# Patient Record
Sex: MALE | Race: WHITE | URBAN - METROPOLITAN AREA
[De-identification: names, ages, dates, MRNs, and addresses within clinical notes are randomized per-mention and may not be internally consistent; named-entity substitution may affect disease eponyms.]

---

## 2017-09-22 ENCOUNTER — RADIANT APPOINTMENT (OUTPATIENT)
Dept: GENERAL RADIOLOGY | Facility: CLINIC | Age: 32
End: 2017-09-22
Attending: PHYSICIAN ASSISTANT
Payer: COMMERCIAL

## 2017-09-22 ENCOUNTER — OFFICE VISIT (OUTPATIENT)
Dept: FAMILY MEDICINE | Facility: CLINIC | Age: 32
End: 2017-09-22
Payer: COMMERCIAL

## 2017-09-22 VITALS
WEIGHT: 218.6 LBS | SYSTOLIC BLOOD PRESSURE: 135 MMHG | OXYGEN SATURATION: 98 % | DIASTOLIC BLOOD PRESSURE: 80 MMHG | TEMPERATURE: 98.5 F | BODY MASS INDEX: 31.37 KG/M2 | HEART RATE: 71 BPM

## 2017-09-22 DIAGNOSIS — S49.91XA RIGHT SHOULDER INJURY, INITIAL ENCOUNTER: Primary | ICD-10-CM

## 2017-09-22 PROCEDURE — 99213 OFFICE O/P EST LOW 20 MIN: CPT | Performed by: PHYSICIAN ASSISTANT

## 2017-09-22 PROCEDURE — 73030 X-RAY EXAM OF SHOULDER: CPT | Mod: RT

## 2017-09-22 NOTE — MR AVS SNAPSHOT
After Visit Summary   9/22/2017    Getachew Pizano    MRN: [id number]           Patient Information     Date Of Birth          1985        Visit Information        Provider Department      9/22/2017 1:40 PM Diana Riuz PA-C [facility]        Today's Diagnoses     Right shoulder injury, initial encounter    -  1      Care Instructions    Continue to rest and ice the shoulder often.    Alternate ibuprofen and tylenol as needed for discomfort.     Follow up for your MRI as scheduled.    Follow up with orthopedics as scheduled next week.          Follow-ups after your visit        Additional Services     ORTHOPEDICS ADULT REFERRAL       Your provider has referred you to: [facility]: [facility] [phone]   [email or web address]    Please be aware that coverage of these services is subject to the terms and limitations of your health insurance plan.  Call member services at your health plan with any benefit or coverage questions.      Please bring the following to your appointment:    >>   Any x-rays, CTs or MRIs which have been performed.  Contact the facility where they were done to arrange for  prior to your scheduled appointment.    >>   List of current medications   >>   This referral request   >>   Any documents/labs given to you for this referral                  Your next 10 appointments already scheduled     Sep 25, 2017  3:30 PM CDT   MR SHOULDER RIGHT W/O CONTRAST with [id number]   [facility] ([facility]3] [place] ([facility])    [address] [address] [address] [phone]           Take your medicines as usual, unless your doctor tells you not to. Bring a list of your current medicines to your exam (including vitamins, minerals and over-the-counter drugs). Also bring the results of similar scans you may have had.  Please remove any body piercings and hair extensions before you arrive.  Follow your  doctor s orders. If you do not, we may have to postpone your exam.  You will not have contrast for this exam. You do not need to do anything special to prepare.  The MRI machine uses a strong magnet. Please wear clothes without metal (snaps, zippers). A sweatsuit works well, or we may give you a hospital gown.   **IMPORTANT** THE INSTRUCTIONS BELOW ARE ONLY FOR THOSE PATIENTS WHO HAVE BEEN TOLD THEY WILL RECEIVE SEDATION OR GENERAL ANESTHESIA DURING THEIR MRI PROCEDURE:  IF YOU WILL RECEIVE SEDATION (take medicine to help you relax during your exam):   You must get the medicine from your doctor before you arrive. Bring the medicine to the exam. Do not take it at home.   Arrive one hour early. Bring someone who can take you home after the test. Your medicine will make you sleepy. After the exam, you may not drive, take a bus or take a taxi by yourself.   No eating 8 hours before your exam. You may have clear liquids up until 4 hours before your exam. (Clear liquids include water, clear tea, black coffee and fruit juice without pulp.)  IF YOU WILL RECEIVE ANESTHESIA (be asleep for your exam):   Arrive 1 1/2 hours early. Bring someone who can take you home after the test. You may not drive, take a bus or take a taxi by yourself.   No eating 8 hours before your exam. You may have clear liquids up until 4 hours before your exam. (Clear liquids include water, clear tea, black coffee and fruit juice without pulp.)   You will spend four to five hours in the recovery room.  Please call the Imaging Department at your exam site with any questions.            Sep 28, 2017  9:00 AM CDT   [facility] Visit [facility]dez [facility]   [facility]    [address] [facility] [address] [phone]              Future tests that were ordered for you today     Open Future Orders        Priority Expected Expires Ordered    MR Shoulder Right w/o Contrast Routine  9/22/2018 9/22/2017             Who to contact     If you have questions or need follow up information about today's clinic visit or your schedule please contact [facility] directly at [phone].  Normal or non-critical lab and imaging results will be communicated to you by MyChart, letter or phone within 4 business days after the clinic has received the results. If you do not hear from us within 7 days, please contact the clinic through [employer] or phone. If you have a critical or abnormal lab result, we will notify you by phone as soon as possible.  Submit refill requests through [employer] or call your pharmacy and they will forward the refill request to us. Please allow 3 business days for your refill to be completed.          Additional Information About Your Visit        [employer] Information     [employer] gives you secure access to your electronic health record. If you see a primary care provider, you can also send messages to your care team and make appointments. If you have questions, please call your primary care clinic.  If you do not have a primary care provider, please call [phone] and they will assist you.        Care EveryWhere ID     This is your Care EveryWhere ID. This could be used by other organizations to access your [place] medical records  [id number]        Your Vitals Were     Pulse Temperature Pulse Oximetry BMI (Body Mass Index)          71 98.5  F (36.9  C) (Oral) 98% 31.37 kg/m2         Blood Pressure from Last 3 Encounters:   09/22/17 135/80   11/03/16 (!) 157/97   11/01/16 (!) 150/98    Weight from Last 3 Encounters:   09/22/17 218 lb 9.6 oz (99.2 kg)   11/03/16 257 lb (116.6 kg)   11/01/16 258 lb (117 kg)              We Performed the Following     ORTHOPEDICS ADULT REFERRAL     XR Shoulder Right 2 Views        Primary Care Provider    Physician No Ref-Primary       No address on file        Equal Access to Services     ACOSTA JOHNSON : saeid Smith qaybta  pricilla cope ibeth licea. So [facility] [phone].    ATENCIÓN: Si habla zoraida, tiene a fountain disposición servicios gratuitos de asistencia lingüística. Llame al [phone].    We comply with applicable federal civil rights laws and [place] laws. We do not discriminate on the basis of race, color, national origin, age, disability sex, sexual orientation or gender identity.            Thank you!     Thank you for choosing [facility]  for your care. Our goal is always to provide you with excellent care. Hearing back from our patients is one way we can continue to improve our services. Please take a few minutes to complete the written survey that you may receive in the mail after your visit with us. Thank you!             Your Updated Medication List - Protect others around you: Learn how to safely use, store and throw away your medicines at www.disposemymeds.org.      Notice  As of 9/22/2017  2:12 PM    You have not been prescribed any medications.

## 2017-09-22 NOTE — LETTER
[facility]  [address] [facility] [address]  Phone: [phone]    September 22, 2017        Getachew Pizano  [address] [address]          To whom it may concern:    RE: Getachew Pizano    Patient was seen and treated today at our clinic and missed work. He is missing work due to an injury he did not sustain from work. He is excused from work until re-evaluated by Orthopedics on 9/28/17.    Please contact me for questions or concerns.      Sincerely,        Diana Ruiz PA-C

## 2017-09-22 NOTE — PROGRESS NOTES
SUBJECTIVE:   Getachew Pizano is a 32 year old male who presents to clinic today for the following health issues:        Musculoskeletal problem/pain      Duration: 3 weeks    Description  Location: right shoulder pain     Intensity:  8/10 when lifting things    Accompanying signs and symptoms: none    History  Previous similar problem: Not on right shoulder- on left shoulder  Previous evaluation:  none    Precipitating or alleviating factors:  Trauma or overuse: YES- fell off scaffolding  Aggravating factors include: lifting and using it in general    Therapies tried and outcome: rest/inactivity and stretching    Fell 6-8 feet off of scaffolding. Landed on his feet and hands.   Locates pain to the anterior right shoulder.  Decreased range of motion with abduction - unable to go past 90 degrees.  Denies numbness and tingling.   Denies any ecchymosis and swelling.  He is right hand dominant.     He works on the [employer]. Requesting a letter for work.       Problem list and histories reviewed & adjusted, as indicated.  Additional history: as documented    Patient Active Problem List   Diagnosis     CARDIOVASCULAR SCREENING; LDL GOAL LESS THAN 160     Glenoid labrum tear     Tobacco abuse     Scapholunate ligament injury with no instability     Left wrist pain     Past Surgical History:   Procedure Laterality Date     C SHOULDER SURG PROC UNLISTED         Social History   Substance Use Topics     Smoking status: Current Every Day Smoker     Packs/day: 1.00     Years: 8.00     Types: Cigarettes     Smokeless tobacco: Never Used     Alcohol use Yes      Comment: Social     Family History   Problem Relation Age of Onset     Neurologic Disorder Maternal Grandfather      Dementia     DIABETES No family hx of          No current outpatient prescriptions on file.     No Known Allergies  BP Readings from Last 3 Encounters:   09/22/17 135/80   11/03/16 (!) 157/97   11/01/16 (!) 150/98    Wt Readings from Last 3 Encounters:    09/22/17 218 lb 9.6 oz (99.2 kg)   11/03/16 257 lb (116.6 kg)   11/01/16 258 lb (117 kg)                        Reviewed and updated as needed this visit by clinical staffTobacco  Allergies  Meds  Med Hx  Surg Hx  Fam Hx  Soc Hx      Reviewed and updated as needed this visit by Provider         ROS:  Constitutional, musculoskeletal and integumentary systems are negative, except as otherwise noted.      OBJECTIVE:   /80  Pulse 71  Temp 98.5  F (36.9  C) (Oral)  Wt 218 lb 9.6 oz (99.2 kg)  SpO2 98%  BMI 31.37 kg/m2  Body mass index is 31.37 kg/(m^2).  GENERAL: healthy, alert and no distress  MS: right shoulder: no significant tenderness to palpation, decreased range of motion - unable to abduct arm past 90 degrees due to pain, good flexion and extension, normal sensation and capillary refill, no obvious deformity observed or palpated  SKIN: no suspicious lesions or rashes    Diagnostic Test Results:  Xray - unremarkable. Radiology read is pending    ASSESSMENT/PLAN:       ICD-10-CM    1. Right shoulder injury, initial encounter S49.91XA MR Shoulder Right w/o Contrast     ORTHOPEDICS ADULT REFERRAL     XR Shoulder Right G/E 3 Views     CANCELED: XR Shoulder Right 2 Views     Concerns for rotator cuff tear due to decreased range of motion. Will refer for MRI and orthopedic follow up.     Patient Instructions   Continue to rest and ice the shoulder often.    Alternate ibuprofen and tylenol as needed for discomfort.     Follow up for your MRI as scheduled.    Follow up with orthopedics as scheduled next week.      Diana Ruiz PA-C  [facility]

## 2017-09-22 NOTE — LETTER
[facility]  [address] [facility] [address]  Phone: [phone]    September 22, 2017        Getachew Pizano  [address] [address] [address]          To whom it may concern:    RE: Getachew Pizano    Patient was seen and treated today at our clinic and missed work. Patient missed work due to an injury, which he did not receive from work.     Please contact me for questions or concerns.      Sincerely,        Diana Ruiz PA-C

## 2017-09-22 NOTE — PATIENT INSTRUCTIONS
Continue to rest and ice the shoulder often.    Alternate ibuprofen and tylenol as needed for discomfort.     Follow up for your MRI as scheduled.    Follow up with orthopedics as scheduled next week.

## 2017-09-22 NOTE — NURSING NOTE
"Chief Complaint   Patient presents with     Shoulder Pain       Initial /80  Pulse 71  Temp 98.5  F (36.9  C) (Oral)  Wt 218 lb 9.6 oz (99.2 kg)  SpO2 98%  BMI 31.37 kg/m2 Estimated body mass index is 31.37 kg/(m^2) as calculated from the following:    Height as of 11/1/16: 5' 10\" (1.778 m).    Weight as of this encounter: 218 lb 9.6 oz (99.2 kg).  Medication Reconciliation: complete    Monie Brooks CMA  "

## 2017-09-25 ENCOUNTER — RADIANT APPOINTMENT (OUTPATIENT)
Dept: MRI IMAGING | Facility: CLINIC | Age: 32
End: 2017-09-25
Attending: PHYSICIAN ASSISTANT
Payer: COMMERCIAL

## 2017-09-25 DIAGNOSIS — S49.91XA RIGHT SHOULDER INJURY, INITIAL ENCOUNTER: ICD-10-CM

## 2017-09-25 PROCEDURE — 73221 MRI JOINT UPR EXTREM W/O DYE: CPT | Mod: TC

## 2017-09-28 ENCOUNTER — OFFICE VISIT (OUTPATIENT)
Dept: ORTHOPEDICS | Facility: CLINIC | Age: 32
End: 2017-09-28
Payer: COMMERCIAL

## 2017-09-28 VITALS — HEIGHT: 70 IN | WEIGHT: 218 LBS | RESPIRATION RATE: 16 BRPM | BODY MASS INDEX: 31.21 KG/M2

## 2017-09-28 DIAGNOSIS — S46.011A STRAIN OF MUSCLE(S) AND TENDON(S) OF THE ROTATOR CUFF OF RIGHT SHOULDER, INITIAL ENCOUNTER: ICD-10-CM

## 2017-09-28 DIAGNOSIS — S46.111A STRAIN OF MUSCLE, FASCIA AND TENDON OF LONG HEAD OF BICEPS, RIGHT ARM, INITIAL ENCOUNTER: Primary | ICD-10-CM

## 2017-09-28 PROCEDURE — 99243 OFF/OP CNSLTJ NEW/EST LOW 30: CPT | Performed by: ORTHOPAEDIC SURGERY

## 2017-09-28 NOTE — PROGRESS NOTES
"SUBJECTIVE:  Getachew Pizano is a 32 year old male who is seen in consultation at the request of Diana Ruiz (KEREN) for right shoulder injury that occurred on 9/1/17. Patient fell off a 6-8 foot scaffold, landing on his feet and then rolled forward onto outstretched hands. The patient felt shoulder pain immediately following the injury. He returned to work 3 weeks after the injury and has had trouble using manual hand brake at work.    Present symptoms: limited ROM, deep pain, pain lifting things, reaching across body, pain reaching behind the back, +popping and cracking in shoulder. Positional night pain.  Pain severity: 8/10  Pain location: anterior shoulder    No associated neck pain.    Treatment up to this point: stretching  Occupation:      Past Medical History:   Diagnosis Date     NO ACTIVE PROBLEMS        Past Surgical History:   Procedure Laterality Date     C SHOULDER SURG PROC UNLISTED         REVIEW OF SYSTEMS:  CONSTITUTIONAL:  NEGATIVE for fever, chills, change in weight  INTEGUMENTARY/SKIN:  NEGATIVE for worrisome rashes, moles or lesions  EYES:  NEGATIVE for vision changes or irritation  ENT/MOUTH:  NEGATIVE for ear, mouth and throat problems  RESP:  NEGATIVE for significant cough or SOB  BREAST:  NEGATIVE for masses, tenderness or discharge  CV:  NEGATIVE for chest pain, palpitations or peripheral edema  GI:  NEGATIVE for nausea, abdominal pain, heartburn, or change in bowel habits  :  Negative   MUSCULOSKELETAL:  See HPI above  NEURO:  NEGATIVE for weakness, dizziness or paresthesias  ENDOCRINE:  NEGATIVE for temperature intolerance, skin/hair changes  HEME/ALLERGY/IMMUNE:  NEGATIVE for bleeding problems  PSYCHIATRIC:  NEGATIVE for changes in mood or affect    OBJECTIVE:  Resp 16  Ht 1.778 m (5' 10\")  Wt 98.9 kg (218 lb)  BMI 31.28 kg/m2     GENERAL: healthy, alert and no distress  GAIT: normal   SKIN: no suspicious lesions or rashes  NEURO: Normal strength and tone, " mentation intact and speech normal  VASCULAR: normal pulses and cappillary refill   PSYCH:  mentation appears normal and affect normal/bright  RESP: No increased work of breathing  LYMPH:  No lymphedema    MUSCULOSKELETAL:    NECK:  Cervical range of motion: full,  painfree, and does not cause shoulder pain or reproduce shoulder pain.    SHOULDER:  Shoulder Inspection: no rotator cuff atrophy  Tender: proximal bicep tendon  Non-tender: AC joint and greater tuberosity  Range of Motion:   Active: Forward flexion full, Internal rotation: to back pocket   Passive: External rotation: 70, no crepitations  Strength: forward flexion 5-/5, External rotation 5/5   Liftoff: can't perform due to mechanical ROM, Bear Hug: Able    Special tests: Sulcus: 0  Apprehension: painful  Speed: Positive  Anterior Drawer: 0  Posterior Drawer: 0  O'Araceli (SLAP/biceps):  Positive    X-RAY   Obtained 09/22/17 of the RIGHT SHOULDER: 2-views, reviewed in the office with the patient by myself today and show a type III acromian, normal glenohumeral and AC joints, and no fractures.     MRI: From 09/25/17 of RIGHT SHOULDER was reviewed personally by myself:   1. Nonspecific red marrow pattern, as above.  2. The rotator cuff appears unremarkable.  3. Fluid signal within the biceps tendon noted.  Biceps in the groove.  4. Type 3 acromion  5.  SLAP area looks normal.    ASSESSMENT  1. Mild rotator cuff strain  2. Biceps strain    PLAN:   I discussed the findings and diagnosis with the patient. We talked about the treatment options: corticosteroid injections and PT. I suggested he ice and take antiinflammatories as needed. I also recommended he start physical therapy. If in three weeks symptoms do not improve, we would discuss a steroid injection. All questions were answered.  PT ordered.   Work note written: off work for 1 week    Return to clinic: 3 weeks or PRN    .EDWIN Hdez MD  Dept. Orthopedic Surgery  Health system    This  document serves as a record of the services and decisions personally performed and made by Dr. EDWIN Hdez MD. It was created on his behalf by Malick Cancino, a trained medical scribe. The creation of this record is based on the provider's personal observations and the statements of the patient. This document has been checked and approved by the attending provider.   Malick Cancino September 28, 2017 9:39 AM

## 2017-09-28 NOTE — LETTER
[facility]  [address] [place]. [address]  [phone]    Getachew J Harinder    : [birth year]    Date: 17      DIAGNOSIS: Right shoulder contusion.    Work related injury/illness? no  Maximum medical improvement? no  Permanent Partial Disability? unknown  Anticipate permanent restrictions: No    To Whom it May Concern:    This patient was in the  Orthopedic clinic for a medically necessary appointment.      RESTRICTIONS:   Unable to safely work with R shoulder pain being limiting factor.    Next visit : 1 week for exam. Future restrictions to be based on recovery, function.      Please contact me for questions or concerns.    Sincerely,    Provider Electronic Signature (as below)        MIKALA Hdez MD

## 2017-09-28 NOTE — LETTER
9/28/2017         RE: Getachew Pizano  [address] [address]        Dear Colleague,    Thank you for referring your patient, Getachew Pizano, to the [facility]. Please see a copy of my visit note below.    SUBJECTIVE:  Getachew Pizano is a 32 year old male who is seen in consultation at the request of Diana Ruiz (KEREN) for right shoulder injury that occurred on 9/1/17. Patient fell off a 6-8 foot scaffold, landing on his feet and then rolled forward onto outstretched hands. The patient felt shoulder pain immediately following the injury. He returned to work 3 weeks after the injury and has had trouble using manual hand brake at work.    Present symptoms: limited ROM, deep pain, pain lifting things, reaching across body, pain reaching behind the back, +popping and cracking in shoulder. Positional night pain.  Pain severity: 8/10  Pain location: anterior shoulder    No associated neck pain.    Treatment up to this point: stretching  Occupation:      Past Medical History:   Diagnosis Date     NO ACTIVE PROBLEMS        Past Surgical History:   Procedure Laterality Date     C SHOULDER SURG PROC UNLISTED         REVIEW OF SYSTEMS:  CONSTITUTIONAL:  NEGATIVE for fever, chills, change in weight  INTEGUMENTARY/SKIN:  NEGATIVE for worrisome rashes, moles or lesions  EYES:  NEGATIVE for vision changes or irritation  ENT/MOUTH:  NEGATIVE for ear, mouth and throat problems  RESP:  NEGATIVE for significant cough or SOB  BREAST:  NEGATIVE for masses, tenderness or discharge  CV:  NEGATIVE for chest pain, palpitations or peripheral edema  GI:  NEGATIVE for nausea, abdominal pain, heartburn, or change in bowel habits  :  Negative   MUSCULOSKELETAL:  See HPI above  NEURO:  NEGATIVE for weakness, dizziness or paresthesias  ENDOCRINE:  NEGATIVE for temperature intolerance, skin/hair changes  HEME/ALLERGY/IMMUNE:  NEGATIVE for bleeding problems  PSYCHIATRIC:  NEGATIVE for changes  "in mood or affect    OBJECTIVE:  Resp 16  Ht 1.778 m (5' 10\")  Wt 98.9 kg (218 lb)  BMI 31.28 kg/m2     GENERAL: healthy, alert and no distress  GAIT: normal   SKIN: no suspicious lesions or rashes  NEURO: Normal strength and tone, mentation intact and speech normal  VASCULAR: normal pulses and cappillary refill   PSYCH:  mentation appears normal and affect normal/bright  RESP: No increased work of breathing  LYMPH:  No lymphedema    MUSCULOSKELETAL:    NECK:  Cervical range of motion: full,  painfree, and does not cause shoulder pain or reproduce shoulder pain.    SHOULDER:  Shoulder Inspection: no rotator cuff atrophy  Tender: proximal bicep tendon  Non-tender: AC joint and greater tuberosity  Range of Motion:   Active: Forward flexion full, Internal rotation: to back pocket   Passive: External rotation: 70, no crepitations  Strength: forward flexion 5-/5, External rotation 5/5   Liftoff: can't perform due to mechanical ROM, Bear Hug: Able    Special tests: Sulcus: 0  Apprehension: painful  Speed: Positive  Anterior Drawer: 0  Posterior Drawer: 0  O'Araceli (SLAP/biceps):  Positive    X-RAY   Obtained 09/22/17 of the RIGHT SHOULDER: 2-views, reviewed in the office with the patient by myself today and show a type III acromian, normal glenohumeral and AC joints, and no fractures.     MRI: From 09/25/17 of RIGHT SHOULDER was reviewed personally by myself:   1. Nonspecific red marrow pattern, as above.  2. The rotator cuff appears unremarkable.  3. Fluid signal within the biceps tendon noted.  Biceps in the groove.  4. Type 3 acromion  5.  SLAP area looks normal.    ASSESSMENT  1. Mild rotator cuff strain  2. Biceps strain    PLAN:   I discussed the findings and diagnosis with the patient. We talked about the treatment options: corticosteroid injections and PT. I suggested he ice and take antiinflammatories as needed. I also recommended he start physical therapy. If in three weeks symptoms do not improve, we would " discuss a steroid injection. All questions were answered.  PT ordered.   Work note written: off work for 1 week    Return to clinic: 3 weeks or PRN    .EDWIN Hdez MD  Dept. Orthopedic Surgery  [facility]    This document serves as a record of the services and decisions personally performed and made by Dr. EDWIN Hdez MD. It was created on his behalf by Malick Cancino, a trained medical scribe. The creation of this record is based on the provider's personal observations and the statements of the patient. This document has been checked and approved by the attending provider.   Malick Cancino September 28, 2017 9:39 AM        Again, thank you for allowing me to participate in the care of your patient.        Sincerely,        Chet Hdez MD

## 2017-09-28 NOTE — NURSING NOTE
"Chief Complaint   Patient presents with     Consult     Right shoulder fell off scaffold at home 9/1/17 had MRI done 9/25/17 review results       Initial Resp 16  Ht 1.778 m (5' 10\")  Wt 98.9 kg (218 lb)  BMI 31.28 kg/m2 Estimated body mass index is 31.28 kg/(m^2) as calculated from the following:    Height as of this encounter: 1.778 m (5' 10\").    Weight as of this encounter: 98.9 kg (218 lb).  Medication Reconciliation: vinod Da Silva CMA 9/28/2017 8:55 AM    "

## 2017-09-28 NOTE — MR AVS SNAPSHOT
After Visit Summary   9/28/2017    Getachew Pizano    MRN: [id number]           Patient Information     Date Of Birth          1985        Visit Information        Provider Department      9/28/2017 9:00 AM Chet Hdez MD [facility]        Today's Diagnoses     Strain of right rotator cuff capsule    -  1      Care Instructions    The diagnosis is a rotator cuff and biceps strain of the right shoulder. Because there no structural damage. I suggest icing periodically no more than 20 minutes at a time. Taking antiinflammatories as needed. Taking Aleve 2 times a day with food for two weeks and then as needed after that. A work note was written to be off work for 1 additional week. Light duty restrictions after the work. We have also ordered physical therapy.          Follow-ups after your visit        Who to contact     If you have questions or need follow up information about today's clinic visit or your schedule please contact [facility] directly at [phone].  Normal or non-critical lab and imaging results will be communicated to you by [employer], letter or phone within 4 business days after the clinic has received the results. If you do not hear from us within 7 days, please contact the clinic through LEPOWt or phone. If you have a critical or abnormal lab result, we will notify you by phone as soon as possible.  Submit refill requests through [employer] or call your pharmacy and they will forward the refill request to us. Please allow 3 business days for your refill to be completed.          Additional Information About Your Visit        MyChart Information     [employer] gives you secure access to your electronic health record. If you see a primary care provider, you can also send messages to your care team and make appointments. If you have questions, please call your primary care clinic.  If you do not have a primary care provider, please call [phone]  "and they will assist you.        Care EveryWhere ID     This is your Care EveryWhere ID. This could be used by other organizations to access your [place] medical records  [id number]        Your Vitals Were     Respirations Height BMI (Body Mass Index)             16 1.778 m (5' 10\") 31.28 kg/m2          Blood Pressure from Last 3 Encounters:   09/22/17 135/80   11/03/16 (!) 157/97   11/01/16 (!) 150/98    Weight from Last 3 Encounters:   09/28/17 98.9 kg (218 lb)   09/22/17 99.2 kg (218 lb 9.6 oz)   11/03/16 116.6 kg (257 lb)              Today, you had the following     No orders found for display       Primary Care Provider Fax #    Physician No Ref-Primary [phone]       No address on file        Equal Access to Services     ACOSTA JOHNSON : Delroy Bucio, saeid kulkarni, dimitri porras, pricilla lion . So [facility] [phone].    ATENCIÓN: Si habla español, tiene a fountain disposición servicios gratuitos de asistencia lingüística. Tonya al [phone].    We comply with applicable federal civil rights laws and [place] laws. We do not discriminate on the basis of race, color, national origin, age, disability sex, sexual orientation or gender identity.            Thank you!     [facility] you [facility]  [facility]. Our goal is always to provide you with excellent care. Hearing back from our patients is one way we can continue to improve our services. Please take a few minutes to complete the written survey that you may receive in the mail after your visit with us. Thank you!             Your Updated Medication List - Protect others around you: Learn how to safely use, store and throw away your medicines at www.disposemymeds.org.      Notice  As of 9/28/2017  9:28 AM    You have not been prescribed any medications.      "

## 2017-09-28 NOTE — PATIENT INSTRUCTIONS
The diagnosis is a rotator cuff and biceps strain of the right shoulder. Because there no structural damage. I suggest icing periodically no more than 20 minutes at a time. Taking antiinflammatories as needed. Taking Aleve 2 times a day with food for two weeks and then as needed after that. A work note was written to be off work for 1 additional week. Light duty restrictions after that. We have also ordered physical therapy.

## 2017-10-02 ENCOUNTER — THERAPY VISIT (OUTPATIENT)
Dept: PHYSICAL THERAPY | Facility: CLINIC | Age: 32
End: 2017-10-02
Payer: COMMERCIAL

## 2017-10-02 DIAGNOSIS — M25.511 ACUTE PAIN OF RIGHT SHOULDER: Primary | ICD-10-CM

## 2017-10-02 PROCEDURE — 97110 THERAPEUTIC EXERCISES: CPT | Mod: GP | Performed by: PHYSICAL THERAPIST

## 2017-10-02 PROCEDURE — 97161 PT EVAL LOW COMPLEX 20 MIN: CPT | Mod: GP | Performed by: PHYSICAL THERAPIST

## 2017-10-02 NOTE — PROGRESS NOTES
"[facility] Initial Evaluation      Subjective:    Patient is a 32 year old male presenting with rehab right shoulder hpi. The history is provided by the patient. No  was used.   Getachew Pizano is a 32 year old male with a right shoulder condition.  Condition occurred with:  A fall.  Condition occurred: at home.  This is a new condition  Pt states fell from scaffolding about eight feet about four weeks ago and was also doing some lifting at about the same time.  Seemed to land on all fours.  Referred to PT 09/28/2017.    Patient reports pain:  Anterior and in the joint.    Pain is described as aching and is intermittent and reported as 1/10.   Worse during: no particular pattern re: time of day.  Exacerbated by: sleeping (especially staying asleep), putting on a shirt, \"everyday things,\" reaching downward. Relieved by: \"not using it\"  Pain course: pt states may have some increased ROM over the past few days.  Special testing: xray 09/22 and MRI 09/25/2017 in chart.      General health as reported by patient is good.  Pertinent medical history includes:  Overweight and smoking.  Medical allergies: no.  Surgical history: L GH labral debridement 2011 which pt indicates was quite helpful.  Current medications:  Anti-inflammatory.  Current occupation is  / conductor.  Patient is currently not working due to present treatment problem.  Primary job tasks include:  Prolonged standing, lifting, repetitive tasks and operating a machine.    Barriers include:  None as reported by the patient.    Red flags:  None as reported by the patient.    Pt is R dominant.  Pt states his goal is to return to upper body weight lifting program.  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                    Objective:    Standing Alignment:    Cervical/Thoracic:  Normal  Shoulder/UE:  Rounded shoulders                                " "  Cervical/Thoracic Evaluation  Cervical AROM: normal                                  Shoulder Evaluation:  ROM:  AROM:    Flexion:  Left:  155    Right:  156 negative  Extension: Left: 65Right: 43 negative  Abduction:  Left: 152   Right:  55 positive    Internal Rotation:  Left:  T7    Right:  Ischial tuberosity negative  External Rotation:  Left:  62    Right:  15 negative                PROM:      Extension:  Right:  60 \"just a little bit\"  Abduction:  Right:  76 empty end feel    Internal Rotation:  Right:  L5 \"tight\"  External Rotation:  Right:  33 positive                    Strength:    Flexion: Left:5/5    Pain: -    Right: 4+/5      Pain:  +  Extension:  Left: 5-/5     Pain:-    Right: 5-/5     Pain:-  Abduction:  Left: 5/5   Pain:-    Right: 4+/5      Pain:-    Internal Rotation:  Left:5/5      Pain:-    Right: 5-/5      Pain:-  External Rotation:   Left:5/5      Pain:-   Right:5-/5      Pain:-        Elbow Flexion:  Left:5/5      Pain:-    Right:5/5      Pain:-    Stability Testing:      Left shoulder stability negative testing:  Apprehension; Relocation; Load and shift anterior and Load and shift posterior  Right shoulder stability positive testing:Apprehension; Relocation; Load and shift anterior and Load and shift posterior  Special Tests:      Right shoulder positive for the following special tests:Labral  Right shoulder negative for the following special tests:Impingement; Rotator cuff tear and Acrimioclavicular  Palpation:      Right shoulder tenderness present at: Bicipital Groove  Right shoulder tenderness not present at:Supraspinatus; Infraspinatus; Teres Minor or Subscapularis  Mobility Tests:    Glenohumeral anterior left:  Normal  Glenohumeral anterior right:  Normal  Glenohumeral posterior left:  Normal  Glenohumeral posterior right:  Normal  Glenohumeral inferior left:  Normal  Glenohumeral inferior right:  Normal                                             General "     ROS    Assessment/Plan:      Patient is a 32 year old male with right side shoulder complaints.    Patient has the following significant findings with corresponding treatment plan.                Diagnosis 1:  R shoulder pain  Pain -  hot/cold therapy  Decreased ROM/flexibility - manual therapy and therapeutic exercise  Decreased strength - therapeutic exercise and therapeutic activities  Decreased function - therapeutic activities  Impaired posture - neuro re-education    Therapy Evaluation Codes:   1) History comprised of:   Personal factors that impact the plan of care:      Time since onset of symptoms.    Comorbidity factors that impact the plan of care are:      Overweight and Smoking.     Medications impacting care: Anti-inflammatory.  2) Examination of Body Systems comprised of:   Body structures and functions that impact the plan of care:      Cervical spine and Shoulder.   Activity limitations that impact the plan of care are:      Lifting and Sleeping.  3) Clinical presentation characteristics are:   Stable/Uncomplicated.  4) Decision-Making    Moderate complexity using standardized patient assessment instrument and/or measureable assessment of functional outcome.  Cumulative Therapy Evaluation is: Low complexity.    Previous and current functional limitations:  (See Goal Flow Sheet for this information)    Short term and Long term goals: (See Goal Flow Sheet for this information)     Communication ability:  Patient appears to be able to clearly communicate and understand verbal and written communication and follow directions correctly.  Treatment Explanation - The following has been discussed with the patient:   RX ordered/plan of care  Anticipated outcomes  Possible risks and side effects  This patient would benefit from PT intervention to resume normal activities.   Rehab potential is good.    Frequency:  2 X week, once daily  Duration:  for 1 weeks tapering to 1 X a week over 3 weeks  Discharge  Plan:  Achieve all LTG.  Independent in home treatment program.  Reach maximal therapeutic benefit.    Please refer to the daily flowsheet for treatment today, total treatment time and time spent performing 1:1 timed codes.

## 2017-10-02 NOTE — MR AVS SNAPSHOT
After Visit Summary   10/2/2017    Getachew Pizano    MRN: [id number]           Patient Information     Date Of Birth          1985        Visit Information        Provider Department      10/2/2017 10:40 AM Tony Beltran, PT [facility] [place] [facility]        Today's Diagnoses     Acute pain of right shoulder    -  1       Follow-ups after your visit        Your next 10 appointments already scheduled     Oct 06, 2017  2:10 PM CDT   CINTHIA Extremity with Maged Mcadams PTA   [facility] [place] [facility] ([facility] [place])    [address]  [place] [address]   [phone]            Oct 13, 2017  2:10 PM CDT   CINTHIA Extremity with Tony Beltran PT   [facility] [place] [facility] ([facility] [place])    [address]  [place] [address]   [phone]            Oct 20, 2017  1:30 PM CDT   CINTHIA Extremity with Maged Mcadams PTA   [facility] [place] [facility] ([facility] [place])    [address]  [place] [address]   [phone]            Oct 27, 2017  2:10 PM CDT   CINTHIA Extremity with Tony Beltran PT   [facility] [place] [facility] ([facility] [place])    [address]  [place] [address]   [phone]              Who to contact     If you have questions or need follow up information about today's clinic visit or your schedule please contact [facility] directly at [phone].  Normal or non-critical lab and imaging results will be communicated to you by MyChart, letter or phone within 4 business days after the clinic has received the results. If you do not hear from us within 7 days, please contact the clinic through MyChart or phone. If you have a critical or abnormal lab result, we will notify you by phone as soon as possible.  Submit refill requests through [employer] or call your pharmacy and they will  forward the refill request to us. Please allow 3 business days for your refill to be completed.          Additional Information About Your Visit        [employer] Information     [employer] gives you secure access to your electronic health record. If you see a primary care provider, you can also send messages to your care team and make appointments. If you have questions, please call your primary care clinic.  If you do not have a primary care provider, please call [phone] and they will assist you.        Care EveryWhere ID     This is your Care EveryWhere ID. This could be used by other organizations to access your [place] medical records  [id number]         Blood Pressure from Last 3 Encounters:   09/22/17 135/80   11/03/16 (!) 157/97   11/01/16 (!) 150/98    Weight from Last 3 Encounters:   09/28/17 98.9 kg (218 lb)   09/22/17 99.2 kg (218 lb 9.6 oz)   11/03/16 116.6 kg (257 lb)              We Performed the Following     PT Eval, Low Complexity ([id number])     Therapeutic Exercises        Primary Care Provider Fax #    Physician No Ref-Primary [phone]       No address on file        Equal Access to Services     MIKAYLA [facility] : Hadii yrn tamez Soatiya, waaxda lubeccaadaha, qaybta kaalmada chiquita, pricilla lion . So [facility] [phone].    ATENCIÓN: Si habla español, tiene a fountain disposición servicios gratuitos de asistencia lingüística. [facility] [phone].    We comply with applicable federal civil rights laws and [place] laws. We do not discriminate on the basis of race, color, national origin, age, disability, sex, sexual orientation, or gender identity.            Thank you!     Thank you for choosing [facility]  for your care. Our goal is always to provide you with excellent care. Hearing back from our patients is one way we can continue to improve our services. Please take a few minutes to complete the written survey that you may receive in  the mail after your visit with us. Thank you!             Your Updated Medication List - Protect others around you: Learn how to safely use, store and throw away your medicines at www.disposemymeds.org.      Notice  As of 10/2/2017 11:42 AM    You have not been prescribed any medications.

## 2017-10-13 ENCOUNTER — THERAPY VISIT (OUTPATIENT)
Dept: PHYSICAL THERAPY | Facility: CLINIC | Age: 32
End: 2017-10-13
Payer: COMMERCIAL

## 2017-10-13 DIAGNOSIS — M25.511 ACUTE PAIN OF RIGHT SHOULDER: Primary | ICD-10-CM

## 2017-10-13 PROCEDURE — 97110 THERAPEUTIC EXERCISES: CPT | Mod: GP | Performed by: PHYSICAL THERAPIST

## 2017-10-13 PROCEDURE — 97530 THERAPEUTIC ACTIVITIES: CPT | Mod: GP | Performed by: PHYSICAL THERAPIST

## 2017-10-13 NOTE — MR AVS SNAPSHOT
After Visit Summary   10/13/2017    Getachew Pizano    MRN: [id number]           Patient Information     Date Of Birth          1985        Visit Information        Provider Department      10/13/2017 2:10 PM Tony Beltran, PT [facility] [place] [facility]        Today's Diagnoses     Acute pain of right shoulder    -  1       Follow-ups after your visit        Your next 10 appointments already scheduled     Oct 20, 2017  1:30 PM CDT   CINTHIA Extremity with Maged Mcadasm PTA   [facility] [place] [facility] ([facility] [place])    [address]  [place] [address]   [phone]            Oct 27, 2017  2:10 PM CDT   CINTHIA Extremity with Tony Beltran PT   [facility] [place] [facility] ([facility] [place])    [address]  [address]  [place] [address]   [phone]              Who to contact     If you have questions or need follow up information about today's clinic visit or your schedule please contact [facility] directly at [phone].  Normal or non-critical lab and imaging results will be communicated to you by [employer]hart, letter or phone within 4 business days after the clinic has received the results. If you do not hear from us within 7 days, please contact the clinic through [employer] or phone. If you have a critical or abnormal lab result, we will notify you by phone as soon as possible.  Submit refill requests through [employer] or call your pharmacy and they will forward the refill request to us. Please allow 3 business days for your refill to be completed.          Additional Information About Your Visit        MyChart Information     [employer] gives you secure access to your electronic health record. If you see a primary care provider, you can also send messages to your care team and make appointments. If you have questions, please call your primary care clinic.  If you do not have a  primary care provider, please call [phone] and they will assist you.        Care EveryWhere ID     This is your Care EveryWhere ID. This could be used by other organizations to access your [place] medical records  [id number]         Blood Pressure from Last 3 Encounters:   09/22/17 135/80   11/03/16 (!) 157/97   11/01/16 (!) 150/98    Weight from Last 3 Encounters:   09/28/17 98.9 kg (218 lb)   09/22/17 99.2 kg (218 lb 9.6 oz)   11/03/16 116.6 kg (257 lb)              We Performed the Following     Therapeutic Activities     Therapeutic Exercises        Primary Care Provider    Physician No Ref-Primary       NO REF-PRIMARY PHYSICIAN        Equal Access to Services     ACOSTA JOHNSON : Hadii yrn Bucio, walm kulkarni, qaybta kaalmada chiquita, pricilla lion . So [facility] [phone].    ATENCIÓN: Si habla español, tiene a fountain disposición servicios gratuitos de asistencia lingüística. Llame al [phone].    We comply with applicable federal civil rights laws and [place] laws. We do not discriminate on the basis of race, color, national origin, age, disability, sex, sexual orientation, or gender identity.            Thank you!     Thank you for choosing [facility]  for your care. Our goal is always to provide you with excellent care. Hearing back from our patients is one way we can continue to improve our services. Please take a few minutes to complete the written survey that you may receive in the mail after your visit with us. Thank you!             Your Updated Medication List - Protect others around you: Learn how to safely use, store and throw away your medicines at www.disposemymeds.org.      Notice  As of 10/13/2017  3:09 PM    You have not been prescribed any medications.

## 2017-10-13 NOTE — PROGRESS NOTES
Subjective:    HPI                    Objective:    System    Physical Exam    General     ROS    Assessment/Plan:      SUBJECTIVE  Subjective: Pt reports he feels like has had some increased ROM since last appt.  HEP going well and feels is helpful, overall moving the right direction.  Still has episodes where there is pain, seems to happen 3-5 times a day where he gets sharp pain that only lasts a few seconds.   Current Pain level: 1/10   Changes in function:  Yes (See Goal flowsheet attached for changes in current functional level)     Adverse reaction to treatment or activity:  None    OBJECTIVE  Objective: AROM R shoulder flexion 154, abduction 108, ER 63, IR L5 midline.     ASSESSMENT  Getachew continues to require intervention to meet STG and LTG's: PT  Patient is progressing as expected.  Response to therapy has shown an improvement in  ROM   Progress made towards STG/LTG?  Yes (See Goal flowsheet attached for updates on achievement of STG and LTG)    PLAN  Current treatment program is being advanced to more complex exercises.    PTA/ATC plan:  N/A    Please refer to the daily flowsheet for treatment today, total treatment time and time spent performing 1:1 timed codes.

## 2017-10-24 NOTE — PROGRESS NOTES
SUBJECTIVE:  Getachew Pizano is a 32 year old male who is seen in follow-up for his rotator cuff strain and biceps strain of the right shoulder. He reports that his symptoms have improved and he has increased his range of motion. The patient reports that physical therapy has been helpful.     Symptoms have been improving since last visit.    Doing therapy/exercises: (y/n): Yes    GENERAL: healthy, alert and no distress  GAIT: normal   SKIN: no suspicious lesions or rashes  NEURO: Normal strength and tone, mentation intact and speech normal  VASCULAR: normal pulses and cappillary refill   PSYCH:  mentation appears normal and affect normal/bright    SHOULDER:  Strength: forward flexion 5/5  Impingement: Bishop: 1, Neer: 1 and AER: 1  Special tests: Speed: Positive    IMPRESSION:   Doing well status post rotator cuff strain and biceps strain of the right shoulder    PLAN:  Return to work with self- modifications in mind and consider corticosteroid injection if pain worsens.    Work note written and documentation sent so that patient can return to work, full duty beginning 10/26/17.    Total time spent was 15 minutes; with 10 minutes spent face-to-face with patient dedicated to education, counseling and a development of a treatment plan.    Return to clinic PRN.    EDWIN Hdez MD  Dept. Orthopedic Surgery  [facility]    This document serves as a record of the services and decisions personally performed and made by Dr. EDWIN Hdez MD. It was created on his behalf by Adolfo Milner, a trained medical scribe. The creation of this record is based on the provider's personal observations and the statements of the patient. This document has been checked and approved by the attending provider.   Adolfo Milner October 25, 2017 11:39 AM

## 2017-10-25 ENCOUNTER — OFFICE VISIT (OUTPATIENT)
Dept: ORTHOPEDICS | Facility: CLINIC | Age: 32
End: 2017-10-25
Payer: COMMERCIAL

## 2017-10-25 VITALS
DIASTOLIC BLOOD PRESSURE: 80 MMHG | BODY MASS INDEX: 31.21 KG/M2 | SYSTOLIC BLOOD PRESSURE: 128 MMHG | WEIGHT: 218 LBS | HEART RATE: 84 BPM | HEIGHT: 70 IN | OXYGEN SATURATION: 98 %

## 2017-10-25 DIAGNOSIS — S46.011S ROTATOR CUFF STRAIN, RIGHT, SEQUELA: Primary | ICD-10-CM

## 2017-10-25 PROCEDURE — 99213 OFFICE O/P EST LOW 20 MIN: CPT | Performed by: ORTHOPAEDIC SURGERY

## 2017-10-25 ASSESSMENT — PAIN SCALES - GENERAL: PAINLEVEL: EXTREME PAIN (8)

## 2017-10-25 NOTE — NURSING NOTE
"Chief Complaint   Patient presents with     RECHECK     Right shoulder Pt states possibly would like an injection today to help with release of shoulder and would like a note to back to work if possible.Pt has been doing  PT once a week, icing and Aleve       Initial /80  Pulse 84  Ht 1.778 m (5' 10\")  Wt 98.9 kg (218 lb)  SpO2 98%  BMI 31.28 kg/m2 Estimated body mass index is 31.28 kg/(m^2) as calculated from the following:    Height as of this encounter: 1.778 m (5' 10\").    Weight as of this encounter: 98.9 kg (218 lb).  Medication Reconciliation: complete   Mariam Da Silva CMA 10/25/2017 11:09 AM      "

## 2017-10-25 NOTE — MR AVS SNAPSHOT
After Visit Summary   10/25/2017    Getachew Pizano    MRN: [id number]           Patient Information     Date Of Birth          1985        Visit Information        Provider Department      10/25/2017 11:00 AM Chet Hdez MD [facility]        Today's Diagnoses     Rotator cuff strain, right,    -  1       Follow-ups after your visit        Your next 10 appointments already scheduled     Oct 27, 2017  2:10 PM CDT   [facility] Extremity with Tony Beltran PT   [facility] [place] [facility] ([facility] [place])    [address]  [address]  [place] [address] [phone]              Who to contact     If you have questions or need follow up information about today's clinic visit or your schedule please contact [facility] directly at [phone].  Normal or non-critical lab and imaging results will be communicated to you by Gearworkshart, letter or phone within 4 business days after the clinic has received the results. If you do not hear from us within 7 days, please contact the clinic through Gearworkshart or phone. If you have a critical or abnormal lab result, we will notify you by phone as soon as possible.  Submit refill requests through Zimride or call your pharmacy and they will forward the refill request to us. Please allow 3 business days for your refill to be completed.          Additional Information About Your Visit        MyChart Information     Zimride gives you secure access to your electronic health record. If you see a primary care provider, you can also send messages to your care team and make appointments. If you have questions, please call your primary care clinic.  If you do not have a primary care provider, please call [phone] and they will assist you.        Care EveryWhere ID     This is your Care EveryWhere ID. This could be used by other organizations to access your [place] medical records  [id number]        Your Vitals  "Were     Pulse Height Pulse Oximetry BMI (Body Mass Index)          84 1.778 m (5' 10\") 98% 31.28 kg/m2         Blood Pressure from Last 3 Encounters:   10/25/17 128/80   09/22/17 135/80   11/03/16 (!) 157/97    Weight from Last 3 Encounters:   10/25/17 98.9 kg (218 lb)   09/28/17 98.9 kg (218 lb)   09/22/17 99.2 kg (218 lb 9.6 oz)              Today, you had the following     No orders found for display       Primary Care Provider    Physician No Ref-Primary       NO REF-PRIMARY PHYSICIAN        Equal Access to Services     [facility]: Hadii yrn Bucio, saeid kulkarni, dimitri zhengalmabrayan porras, pricilla lion . So [facility] [phone].    ATENCIÓN: Si habla español, tiene a fountain disposición servicios gratuitos de asistencia lingüística. Llame al [phone].    We comply with applicable federal civil rights laws and [place] laws. We do not discriminate on the basis of race, color, national origin, age, disability, sex, sexual orientation, or gender identity.            Thank you!     Thank you for choosing [facility]  for your care. Our goal is always to provide you with excellent care. Hearing back from our patients is one way we can continue to improve our services. Please take a few minutes to complete the written survey that you may receive in the mail after your visit with us. Thank you!             Your Updated Medication List - Protect others around you: Learn how to safely use, store and throw away your medicines at www.disposemymeds.org.          This list is accurate as of: 10/25/17 12:00 PM.  Always use your most recent med list.                   Brand Name Dispense Instructions for use Diagnosis    ALEVE PO      Take 220 mg by mouth 3 times daily (with meals)        TYLENOL PO      Take 1,000 mg by mouth every 8 hours as needed for mild pain or fever          "

## 2017-10-25 NOTE — LETTER
10/25/2017         RE: Getachew Pizano  [address] [address] [address]        Dear Colleague,    Thank you for referring your patient, Getachew Pizano, to the [facility]. Please see a copy of my visit note below.    SUBJECTIVE:  Getachew Pizano is a 32 year old male who is seen in follow-up for his rotator cuff strain and biceps strain of the right shoulder. He reports that his symptoms have improved and he has increased his range of motion. The patient reports that physical therapy has been helpful.     Symptoms have been improving since last visit.    Doing therapy/exercises: (y/n): Yes    GENERAL: healthy, alert and no distress  GAIT: normal   SKIN: no suspicious lesions or rashes  NEURO: Normal strength and tone, mentation intact and speech normal  VASCULAR: normal pulses and cappillary refill   PSYCH:  mentation appears normal and affect normal/bright    SHOULDER:  Strength: forward flexion 5/5  Impingement: Bishop: 1, Neer: 1 and AER: 1  Special tests: Speed: Positive    IMPRESSION:   Doing well status post rotator cuff strain and biceps strain of the right shoulder    PLAN:  Return to work with self- modifications in mind and consider corticosteroid injection if pain worsens.    Work note written and documentation sent so that patient can return to work, full duty beginning 10/26/17.    Return to clinic PRN.    EDWIN Hdez MD  Dept. Orthopedic Surgery  [facility]    This document serves as a record of the services and decisions personally performed and made by Dr. EDWIN Hdez MD. It was created on his behalf by Adolfo Milner, a trained medical scribe. The creation of this record is based on the provider's personal observations and the statements of the patient. This document has been checked and approved by the attending provider.   Adolfo Milner October 25, 2017 11:39 AM        Again, thank you for allowing me to participate in the care of your patient.         Sincerely,        Chet Hdez MD

## 2017-12-12 PROBLEM — M25.511 ACUTE PAIN OF RIGHT SHOULDER: Status: RESOLVED | Noted: 2017-10-02 | Resolved: 2017-12-12

## 2017-12-12 NOTE — PROGRESS NOTES
Subjective:    HPI                    Objective:    System    Physical Exam    General     ROS    Assessment/Plan:      DISCHARGE REPORT    Progress reporting period is from 10/02/2017 to today.   Patient has not returned to therapy so current subjective and objective findings are unknown.  The subjective and objective information are from the last visit (10/13/2017) with this patient.    SUBJECTIVE  Subjective: Pt reports he feels like has had some increased ROM since last appt.  HEP going well and feels is helpful, overall moving the right direction.  Still has episodes where there is pain, seems to happen 3-5 times a day where he gets sharp pain that only lasts a few seconds.   Current Pain level: 1/10   Initial Pain level: 1/10     OBJECTIVE  Objective: AROM R shoulder flexion 154, abduction 108, ER 63, IR L5 midline.      ASSESSMENT/PLAN  Updated problem list and treatment plan: Diagnosis 1:  Shoulder pain -- home program  STG/LTGs have been met or progress has been made towards goals:  N/A  Assessment of Progress: The patient has not returned to therapy. Current status is unknown.  Self Management Plans:  Patient has been instructed in a home treatment program.  Getachew continues to require the following intervention to meet STG and LTG's: PT intervention is no longer required to meet STG/LTG.    Recommendations:  Pt last seen in PT 10/13/2017.  He cancelled f/u and has since seen Dr Hdez.  Discharge to [facility].

## 2017-12-18 ENCOUNTER — OFFICE VISIT (OUTPATIENT)
Dept: URGENT CARE | Facility: URGENT CARE | Age: 32
End: 2017-12-18
Payer: COMMERCIAL

## 2017-12-18 VITALS
BODY MASS INDEX: 30.13 KG/M2 | SYSTOLIC BLOOD PRESSURE: 127 MMHG | OXYGEN SATURATION: 97 % | DIASTOLIC BLOOD PRESSURE: 75 MMHG | WEIGHT: 210 LBS | HEART RATE: 76 BPM | TEMPERATURE: 97.6 F

## 2017-12-18 DIAGNOSIS — S70.11XA: Primary | ICD-10-CM

## 2017-12-18 PROCEDURE — 99213 OFFICE O/P EST LOW 20 MIN: CPT | Performed by: FAMILY MEDICINE

## 2017-12-18 ASSESSMENT — ENCOUNTER SYMPTOMS
MYALGIAS: 0
FALLS: 0
CONSTITUTIONAL NEGATIVE: 1

## 2017-12-18 NOTE — MR AVS SNAPSHOT
After Visit Summary   12/18/2017    Getachew Pizano    MRN: [id number]           Patient Information     Date Of Birth          1985        Visit Information        Provider Department      12/18/2017 6:45 PM Ashwin Haywood MD [facility]        Today's Diagnoses     Traumatic ecchymosis of thigh, right, initial encounter    -  1      Care Instructions      Lower Extremity Contusion  You have a contusion (bruise) of a lower extremity (leg, knee, ankle, foot, or toe). Symptoms include pain, swelling, and skin discoloration. No bones are broken. This injury may take from a few days to a few weeks to heal.  During that time, the bruise may change from reddish in color, to purple-blue, to green-yellow, to yellow-brown.  Home care    Unless another medicine was prescribed, you can take acetaminophen, ibuprofen, or naproxen to control pain. (If you have chronic liver or kidney disease or ever had a stomach ulcer or gastrointestinal bleeding, talk with your doctor before using these medicines.)    Elevate the injured area to reduce pain and swelling. As much as possible, sit or lie down with the injured area raised about the level of your heart. This is especially important during the first 48 hours.    Ice the injured area to help reduce pain and swelling. Wrap a cold source (ice pack or ice cubes in a plastic bag) in a thin towel. Apply to the bruised area for 20 minutes every 1 to 2 hours the first day. Continue this 3 to 4 times a day until the pain and swelling goes away.    If crutches have been advised, do not bear full weight on the injured leg until you can do so without pain. You may return to sports when you are able to put full weight and impact on the injured leg without pain.  Follow up  Follow up with your healthcare provider or our staff as advised. Call if you are not improving within the next 1 to 2 weeks.  When to seek medical advice   Call your healthcare  provider right away if any of these occur:    Increased pain or swelling    Foot or toes become cold, blue, numb or tingly    Signs of infection: Warmth, drainage, or increased redness or pain around the injury    Inability to move the injured area     Frequent bruising for unknown reasons  Date Last Reviewed: 2/1/2017 2000-2017 [employer]. [address] [place], [place] [id number]. All rights reserved. This information is not intended as a substitute for professional medical care. Always follow your healthcare professional's instructions.      Heat for comfort    Ibuprofen as needed for pain.    Activity as tolerated          Follow-ups after your visit        Who to contact     If you have questions or need follow up information about today's clinic visit or your schedule please contact [facility] directly at [phone].  Normal or non-critical lab and imaging results will be communicated to you by MyChart, letter or phone within 4 business days after the clinic has received the results. If you do not hear from us within 7 days, please contact the clinic through KinDex Therapeuticshart or phone. If you have a critical or abnormal lab result, we will notify you by phone as soon as possible.  Submit refill requests through Grows Up or call your pharmacy and they will forward the refill request to us. Please allow 3 business days for your refill to be completed.          Additional Information About Your Visit        [employer] Information     Grows Up gives you secure access to your electronic health record. If you see a primary care provider, you can also send messages to your care team and make appointments. If you have questions, please call your primary care clinic.  If you do not have a primary care provider, please call [phone] and they will assist you.        Care EveryWhere ID     This is your Care EveryWhere ID. This could be used by other organizations to access your [facility]  records  [id number]        Your Vitals Were     Pulse Temperature Pulse Oximetry BMI (Body Mass Index)          76 97.6  F (36.4  C) (Oral) 97% 30.13 kg/m2         Blood Pressure from Last 3 Encounters:   12/18/17 127/75   10/25/17 128/80   09/22/17 135/80    Weight from Last 3 Encounters:   12/18/17 210 lb (95.3 kg)   10/25/17 218 lb (98.9 kg)   09/28/17 218 lb (98.9 kg)              Today, you had the following     No orders found for display       Primary Care Provider Fax #    Physician No Ref-Primary [phone]       No address on file        Equal Access to Services     [facility] : Delroy Bucio, saeid kulkarni, dimitri porras, pricilla licea. So [facility] [phone].    ATENCIÓN: Si habla español, tiene a fountain disposición servicios gratuitos de asistencia lingüística. Llame al [phone].    We comply with applicable federal civil rights laws and [place] laws. We do not discriminate on the basis of race, color, national origin, age, disability, sex, sexual orientation, or gender identity.            Thank you!     [facility]  [facility]. Our goal is always to provide you with excellent care. Hearing back from our patients is one way we can continue to improve our services. Please take a few minutes to complete the written survey that you may receive in the mail after your visit with us. Thank you!             Your Updated Medication List - Protect others around you: Learn how to safely use, store and throw away your medicines at www.disposemymeds.org.          This list is accurate as of: 12/18/17  8:15 PM.  Always use your most recent med list.                   Brand Name Dispense Instructions for use Diagnosis    ALEVE PO      Take 220 mg by mouth 3 times daily (with meals)        TYLENOL PO      Take 1,000 mg by mouth every 8 hours as needed for mild pain or fever

## 2017-12-19 NOTE — PATIENT INSTRUCTIONS
Lower Extremity Contusion  You have a contusion (bruise) of a lower extremity (leg, knee, ankle, foot, or toe). Symptoms include pain, swelling, and skin discoloration. No bones are broken. This injury may take from a few days to a few weeks to heal.  During that time, the bruise may change from reddish in color, to purple-blue, to green-yellow, to yellow-brown.  Home care    Unless another medicine was prescribed, you can take acetaminophen, ibuprofen, or naproxen to control pain. (If you have chronic liver or kidney disease or ever had a stomach ulcer or gastrointestinal bleeding, talk with your doctor before using these medicines.)    Elevate the injured area to reduce pain and swelling. As much as possible, sit or lie down with the injured area raised about the level of your heart. This is especially important during the first 48 hours.    Ice the injured area to help reduce pain and swelling. Wrap a cold source (ice pack or ice cubes in a plastic bag) in a thin towel. Apply to the bruised area for 20 minutes every 1 to 2 hours the first day. Continue this 3 to 4 times a day until the pain and swelling goes away.    If crutches have been advised, do not bear full weight on the injured leg until you can do so without pain. You may return to sports when you are able to put full weight and impact on the injured leg without pain.  Follow up  Follow up with your healthcare provider or our staff as advised. Call if you are not improving within the next 1 to 2 weeks.  When to seek medical advice   Call your healthcare provider right away if any of these occur:    Increased pain or swelling    Foot or toes become cold, blue, numb or tingly    Signs of infection: Warmth, drainage, or increased redness or pain around the injury    Inability to move the injured area     Frequent bruising for unknown reasons  Date Last Reviewed: 2/1/2017 2000-2017 [employer]. [address] [place] [address], [place] [address].  All rights reserved. This information is not intended as a substitute for professional medical care. Always follow your healthcare professional's instructions.      Heat for comfort    Ibuprofen as needed for pain.    Activity as tolerated

## 2017-12-19 NOTE — PROGRESS NOTES
SUBJECTIVE:   Getachew Pizano is a 32 year old male presenting with a chief complaint of   Chief Complaint   Patient presents with     Swelling     R leg swollen. Noticed this 3-4 days ago.    .    Onset of symptoms was 3 day(s) ago.  Location: right thigh  Context:       The injury happened while at work      Mechanism: direct blow      Patient experienced immediate pain, delayed swelling  Course of symptoms is same.    Severity mild  Current and Associated symptoms: Swelling  Denies  Bruising, Warmth, Redness, Decreased range of motion and Stiffness  Aggravating Factors: palpation  Therapies to improve symptoms include: ice  This is the first time this type of problem has occurred for this patient.   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    Review of Systems   Constitutional: Negative.    Musculoskeletal: Negative for falls, joint pain and myalgias.   Skin: Negative.          Past Medical History:   Diagnosis Date     NO ACTIVE PROBLEMS      Current Outpatient Prescriptions   Medication Sig Dispense Refill     Acetaminophen (TYLENOL PO) Take 1,000 mg by mouth every 8 hours as needed for mild pain or fever       Naproxen Sodium (ALEVE PO) Take 220 mg by mouth 3 times daily (with meals)       Social History   Substance Use Topics     Smoking status: Current Every Day Smoker     Packs/day: 1.00     Years: 8.00     Types: Cigarettes     Smokeless tobacco: Never Used     Alcohol use Yes      Comment: Social       OBJECTIVE  /75  Pulse 76  Temp 97.6  F (36.4  C) (Oral)  Wt 210 lb (95.3 kg)  SpO2 97%  BMI 30.13 kg/m2    Physical Exam   Constitutional: He is well-developed, well-nourished, and in no distress. No distress.   HENT:   Head: Normocephalic and atraumatic.   Pulmonary/Chest: Effort normal.   Musculoskeletal: He exhibits edema (Large hematoma on R lat thigh.  ) and tenderness.   Neurological: He is alert. Gait normal.   Skin: Skin is warm and dry. No rash noted. He  is not diaphoretic.   Psychiatric: Mood and affect normal.       Labs:  No results found for this or any previous visit (from the past 24 hour(s)).    X-Ray was not done.    ASSESSMENT:      ICD-10-CM    1. Traumatic ecchymosis of thigh, right, initial encounter S70.11XA         Medical Decision Making:    Differential Diagnosis:  contusion    Serious Comorbid Conditions:  Adult:  None    PLAN:    MS Injury/Pain  heat, stretching and Ibuprofen    Followup:    If not improving or if condition worsens, follow up with your Primary Care Provider    Patient Instructions     Lower Extremity Contusion  You have a contusion (bruise) of a lower extremity (leg, knee, ankle, foot, or toe). Symptoms include pain, swelling, and skin discoloration. No bones are broken. This injury may take from a few days to a few weeks to heal.  During that time, the bruise may change from reddish in color, to purple-blue, to green-yellow, to yellow-brown.  Home care    Unless another medicine was prescribed, you can take acetaminophen, ibuprofen, or naproxen to control pain. (If you have chronic liver or kidney disease or ever had a stomach ulcer or gastrointestinal bleeding, talk with your doctor before using these medicines.)    Elevate the injured area to reduce pain and swelling. As much as possible, sit or lie down with the injured area raised about the level of your heart. This is especially important during the first 48 hours.    Ice the injured area to help reduce pain and swelling. Wrap a cold source (ice pack or ice cubes in a plastic bag) in a thin towel. Apply to the bruised area for 20 minutes every 1 to 2 hours the first day. Continue this 3 to 4 times a day until the pain and swelling goes away.    If crutches have been advised, do not bear full weight on the injured leg until you can do so without pain. You may return to sports when you are able to put full weight and impact on the injured leg without pain.  Follow up  Follow up  with your healthcare provider or our staff as advised. Call if you are not improving within the next 1 to 2 weeks.  When to seek medical advice   Call your healthcare provider right away if any of these occur:    Increased pain or swelling    Foot or toes become cold, blue, numb or tingly    Signs of infection: Warmth, drainage, or increased redness or pain around the injury    Inability to move the injured area     Frequent bruising for unknown reasons  Date Last Reviewed: 2/1/2017 2000-2017 [employer]. [address] [place], [place] [id number]. All rights reserved. This information is not intended as a substitute for professional medical care. Always follow your healthcare professional's instructions.      Heat for comfort    Ibuprofen as needed for pain.    Activity as tolerated

## 2017-12-19 NOTE — NURSING NOTE
"Chief Complaint   Patient presents with     Swelling     R leg swollen. Noticed this 3-4 days ago.        Initial /75  Pulse 76  Temp 97.6  F (36.4  C) (Oral)  Wt 210 lb (95.3 kg)  SpO2 97%  BMI 30.13 kg/m2 Estimated body mass index is 30.13 kg/(m^2) as calculated from the following:    Height as of 10/25/17: 5' 10\" (1.778 m).    Weight as of this encounter: 210 lb (95.3 kg).  Medication Reconciliation: complete     TYRA DO        "

## 2020-03-01 ENCOUNTER — HEALTH MAINTENANCE LETTER (OUTPATIENT)
Age: 35
End: 2020-03-01

## 2020-12-14 ENCOUNTER — HEALTH MAINTENANCE LETTER (OUTPATIENT)
Age: 35
End: 2020-12-14

## 2021-04-18 ENCOUNTER — HEALTH MAINTENANCE LETTER (OUTPATIENT)
Age: 36
End: 2021-04-18

## 2021-10-02 ENCOUNTER — HEALTH MAINTENANCE LETTER (OUTPATIENT)
Age: 36
End: 2021-10-02

## 2022-05-14 ENCOUNTER — HEALTH MAINTENANCE LETTER (OUTPATIENT)
Age: 37
End: 2022-05-14

## 2022-09-03 ENCOUNTER — HEALTH MAINTENANCE LETTER (OUTPATIENT)
Age: 37
End: 2022-09-03

## 2023-06-03 ENCOUNTER — HEALTH MAINTENANCE LETTER (OUTPATIENT)
Age: 38
End: 2023-06-03